# Patient Record
Sex: FEMALE | URBAN - METROPOLITAN AREA
[De-identification: names, ages, dates, MRNs, and addresses within clinical notes are randomized per-mention and may not be internally consistent; named-entity substitution may affect disease eponyms.]

---

## 2020-10-13 ENCOUNTER — OFFICE VISIT (OUTPATIENT)
Dept: URBAN - METROPOLITAN AREA CLINIC 11 | Facility: CLINIC | Age: 45
End: 2020-10-13
Payer: COMMERCIAL

## 2020-10-13 DIAGNOSIS — H25.13 AGE-RELATED NUCLEAR CATARACT, BILATERAL: ICD-10-CM

## 2020-10-13 DIAGNOSIS — H04.123 DRY EYE SYNDROME OF BILATERAL LACRIMAL GLANDS: ICD-10-CM

## 2020-10-13 DIAGNOSIS — H40.023 OPEN ANGLE WITH BORDERLINE FINDINGS, HIGH RISK, BILATERAL: Primary | ICD-10-CM

## 2020-10-13 PROCEDURE — 76514 ECHO EXAM OF EYE THICKNESS: CPT | Performed by: OPHTHALMOLOGY

## 2020-10-13 PROCEDURE — 92133 CPTRZD OPH DX IMG PST SGM ON: CPT | Performed by: OPHTHALMOLOGY

## 2020-10-13 PROCEDURE — 92004 COMPRE OPH EXAM NEW PT 1/>: CPT | Performed by: OPHTHALMOLOGY

## 2020-10-13 PROCEDURE — 92020 GONIOSCOPY: CPT | Performed by: OPHTHALMOLOGY

## 2020-10-13 ASSESSMENT — INTRAOCULAR PRESSURE
OD: 20
OS: 19

## 2020-10-13 NOTE — IMPRESSION/PLAN
Impression: Open angle with borderline findings, high risk, bilateral: H40.023. /570, 10/20 OCT [age] 8/9, GCC 77/74 +Fm Hx (Father) Plan: Discussed risk of vision loss with glaucoma and need for close f/u. IOP reasonable for John Tse 75 appearance OU. Will follow off drops for now. OCT ordered and discussed with patient. Will monitor closely. 6 month HVF/Optos and IOP check Dr Anjel Ng. Repeat RNFL OCT at next visit.

## 2020-10-13 NOTE — ASSESSMENT/PLAN
Impression: OCT - OD: Good-normal; OS: Fair-artifact Plan: OD WNL
OS Artifact [facility] 77/74

## 2024-05-20 ENCOUNTER — OFFICE VISIT (OUTPATIENT)
Facility: LOCATION | Age: 49
End: 2024-05-20
Payer: COMMERCIAL

## 2024-05-20 DIAGNOSIS — H40.023 OPEN ANGLE WITH BORDERLINE FINDINGS, HIGH RISK, BILATERAL: Primary | ICD-10-CM

## 2024-05-20 PROCEDURE — 92133 CPTRZD OPH DX IMG PST SGM ON: CPT | Performed by: OPTOMETRIST

## 2024-05-20 PROCEDURE — 92004 COMPRE OPH EXAM NEW PT 1/>: CPT | Performed by: OPTOMETRIST

## 2024-05-20 PROCEDURE — 76514 ECHO EXAM OF EYE THICKNESS: CPT | Performed by: OPTOMETRIST

## 2024-05-20 ASSESSMENT — INTRAOCULAR PRESSURE
OS: 20
OD: 21
OD: 24
OS: 26

## 2024-05-20 ASSESSMENT — VISUAL ACUITY
OS: 20/20
OD: 20/20

## 2024-06-17 ENCOUNTER — OFFICE VISIT (OUTPATIENT)
Facility: LOCATION | Age: 49
End: 2024-06-17
Payer: COMMERCIAL

## 2024-06-17 DIAGNOSIS — H40.1121 PRIMARY OPEN-ANGLE GLAUCOMA, MILD STAGE, LEFT EYE: ICD-10-CM

## 2024-06-17 DIAGNOSIS — H40.021 OPEN ANGLE WITH BORDERLINE FINDINGS, HIGH RISK, RIGHT EYE: Primary | ICD-10-CM

## 2024-06-17 PROCEDURE — 92134 CPTRZ OPH DX IMG PST SGM RTA: CPT | Performed by: OPTOMETRIST

## 2024-06-17 PROCEDURE — 99214 OFFICE O/P EST MOD 30 MIN: CPT | Performed by: OPTOMETRIST

## 2024-06-17 PROCEDURE — 92083 EXTENDED VISUAL FIELD XM: CPT | Performed by: OPTOMETRIST

## 2024-06-17 RX ORDER — LATANOPROST 50 UG/ML
0.005 % SOLUTION OPHTHALMIC
Qty: 2.5 | Refills: 2 | Status: ACTIVE
Start: 2024-06-17

## 2024-06-17 ASSESSMENT — INTRAOCULAR PRESSURE
OS: 21
OD: 19

## 2024-07-17 ENCOUNTER — OFFICE VISIT (OUTPATIENT)
Facility: LOCATION | Age: 49
End: 2024-07-17
Payer: COMMERCIAL

## 2024-07-17 DIAGNOSIS — H40.023 OPEN ANGLE WITH BORDERLINE FINDINGS, HIGH RISK, BILATERAL: Primary | ICD-10-CM

## 2024-07-17 PROCEDURE — 92012 INTRM OPH EXAM EST PATIENT: CPT

## 2024-07-17 RX ORDER — LATANOPROST 50 UG/ML
0.005 % SOLUTION OPHTHALMIC
Qty: 5 | Refills: 3 | Status: ACTIVE
Start: 2024-07-17

## 2024-07-17 ASSESSMENT — INTRAOCULAR PRESSURE
OS: 25
OD: 18